# Patient Record
Sex: FEMALE | Race: BLACK OR AFRICAN AMERICAN | NOT HISPANIC OR LATINO
[De-identification: names, ages, dates, MRNs, and addresses within clinical notes are randomized per-mention and may not be internally consistent; named-entity substitution may affect disease eponyms.]

---

## 2021-02-03 PROBLEM — Z00.00 ENCOUNTER FOR PREVENTIVE HEALTH EXAMINATION: Status: ACTIVE | Noted: 2021-02-03

## 2021-02-04 ENCOUNTER — APPOINTMENT (OUTPATIENT)
Dept: NEUROSURGERY | Facility: CLINIC | Age: 31
End: 2021-02-04

## 2021-03-11 ENCOUNTER — APPOINTMENT (OUTPATIENT)
Dept: NEUROSURGERY | Facility: CLINIC | Age: 31
End: 2021-03-11
Payer: MEDICAID

## 2021-03-11 VITALS
HEART RATE: 111 BPM | SYSTOLIC BLOOD PRESSURE: 127 MMHG | BODY MASS INDEX: 28 KG/M2 | DIASTOLIC BLOOD PRESSURE: 88 MMHG | TEMPERATURE: 97.9 F | WEIGHT: 158 LBS | HEIGHT: 63 IN | OXYGEN SATURATION: 100 %

## 2021-03-11 DIAGNOSIS — Z78.9 OTHER SPECIFIED HEALTH STATUS: ICD-10-CM

## 2021-03-11 PROCEDURE — 99204 OFFICE O/P NEW MOD 45 MIN: CPT

## 2021-03-11 PROCEDURE — 99072 ADDL SUPL MATRL&STAF TM PHE: CPT

## 2021-03-11 RX ORDER — CETIRIZINE HYDROCHLORIDE 10 MG/1
CAPSULE, LIQUID FILLED ORAL
Refills: 0 | Status: ACTIVE | COMMUNITY

## 2021-03-11 RX ORDER — FLUTICASONE PROPIONATE 50 MCG
SPRAY, SUSPENSION NASAL
Refills: 0 | Status: ACTIVE | COMMUNITY

## 2021-03-11 NOTE — PHYSICAL EXAM
[General Appearance - Alert] : alert [General Appearance - In No Acute Distress] : in no acute distress [Oriented To Time, Place, And Person] : oriented to person, place, and time [Person] : oriented to person [Place] : oriented to place [Time] : oriented to time [Sclera] : the sclera and conjunctiva were normal [Outer Ear] : the ears and nose were normal in appearance [] : no respiratory distress [Abnormal Walk] : normal gait [Skin Color & Pigmentation] : normal skin color and pigmentation

## 2021-03-22 NOTE — REVIEW OF SYSTEMS
[As Noted in HPI] : as noted in HPI [Negative] : Heme/Lymph [de-identified] : Headaches [FreeTextEntry4] : Left Intermittent Pulsatile Tinnitus

## 2021-03-22 NOTE — ASSESSMENT
[FreeTextEntry1] : Impression:\par Recent diagnosis of papilledema\par Presumptive IIH\par Needs further work-up with MRV/ LP/ Venogram\par \par Plan:\par Fluoroscopy guided lumbar puncture\par - Opening pressure measurement\par - CSF analysis\par Cell Count\par Glucose\par Protein\par Cytology\par Gram Stain\par Oligoclonal Bands\par Culture and Sensitivity\par \par Catheter Cerebral Venogram to assess for venous stenosis\par \par

## 2021-03-23 ENCOUNTER — NON-APPOINTMENT (OUTPATIENT)
Age: 31
End: 2021-03-23

## 2021-04-27 ENCOUNTER — TRANSCRIPTION ENCOUNTER (OUTPATIENT)
Age: 31
End: 2021-04-27

## 2021-04-30 ENCOUNTER — RESULT REVIEW (OUTPATIENT)
Age: 31
End: 2021-04-30

## 2021-04-30 ENCOUNTER — OUTPATIENT (OUTPATIENT)
Dept: OUTPATIENT SERVICES | Facility: HOSPITAL | Age: 31
LOS: 1 days | End: 2021-04-30
Payer: MEDICAID

## 2021-04-30 ENCOUNTER — APPOINTMENT (OUTPATIENT)
Dept: NEUROSURGERY | Facility: CLINIC | Age: 31
End: 2021-04-30
Payer: MEDICAID

## 2021-04-30 VITALS
SYSTOLIC BLOOD PRESSURE: 113 MMHG | WEIGHT: 154.98 LBS | OXYGEN SATURATION: 100 % | HEART RATE: 101 BPM | TEMPERATURE: 99 F | DIASTOLIC BLOOD PRESSURE: 70 MMHG | HEIGHT: 63 IN | RESPIRATION RATE: 16 BRPM

## 2021-04-30 VITALS
RESPIRATION RATE: 14 BRPM | OXYGEN SATURATION: 100 % | HEART RATE: 99 BPM | SYSTOLIC BLOOD PRESSURE: 112 MMHG | DIASTOLIC BLOOD PRESSURE: 79 MMHG | TEMPERATURE: 98 F

## 2021-04-30 DIAGNOSIS — I87.1 COMPRESSION OF VEIN: ICD-10-CM

## 2021-04-30 DIAGNOSIS — G93.2 BENIGN INTRACRANIAL HYPERTENSION: ICD-10-CM

## 2021-04-30 LAB
APPEARANCE CSF: CLEAR — SIGNIFICANT CHANGE UP
APTT BLD: 35.9 SEC — HIGH (ref 27.5–35.5)
BLD GP AB SCN SERPL QL: NEGATIVE — SIGNIFICANT CHANGE UP
COLOR CSF: SIGNIFICANT CHANGE UP
GLUCOSE CSF-MCNC: 55 MG/DL — SIGNIFICANT CHANGE UP (ref 40–70)
GRAM STN FLD: SIGNIFICANT CHANGE UP
INR BLD: 1.05 RATIO — SIGNIFICANT CHANGE UP (ref 0.88–1.16)
LYMPHOCYTES # CSF: 89 % — HIGH (ref 40–80)
MONOS+MACROS NFR CSF: 11 % — LOW (ref 15–45)
NEUTROPHILS # CSF: 0 % — SIGNIFICANT CHANGE UP (ref 0–6)
NRBC NFR CSF: 1 /UL — SIGNIFICANT CHANGE UP (ref 0–5)
PROT CSF-MCNC: 20 MG/DL — SIGNIFICANT CHANGE UP (ref 15–45)
PROTHROM AB SERPL-ACNC: 12.6 SEC — SIGNIFICANT CHANGE UP (ref 10.6–13.6)
RBC # CSF: 0 /UL — SIGNIFICANT CHANGE UP (ref 0–0)
RH IG SCN BLD-IMP: POSITIVE — SIGNIFICANT CHANGE UP
RH IG SCN BLD-IMP: POSITIVE — SIGNIFICANT CHANGE UP
SPECIMEN SOURCE: SIGNIFICANT CHANGE UP
TUBE TYPE: SIGNIFICANT CHANGE UP

## 2021-04-30 PROCEDURE — 36012 PLACE CATHETER IN VEIN: CPT | Mod: LT

## 2021-04-30 PROCEDURE — C1887: CPT

## 2021-04-30 PROCEDURE — 87205 SMEAR GRAM STAIN: CPT

## 2021-04-30 PROCEDURE — 36012 PLACE CATHETER IN VEIN: CPT

## 2021-04-30 PROCEDURE — 87070 CULTURE OTHR SPECIMN AEROBIC: CPT

## 2021-04-30 PROCEDURE — C1760: CPT

## 2021-04-30 PROCEDURE — 86850 RBC ANTIBODY SCREEN: CPT

## 2021-04-30 PROCEDURE — 75870 VEIN X-RAY SKULL: CPT

## 2021-04-30 PROCEDURE — 86900 BLOOD TYPING SEROLOGIC ABO: CPT

## 2021-04-30 PROCEDURE — 62328 DX LMBR SPI PNXR W/FLUOR/CT: CPT

## 2021-04-30 PROCEDURE — 88108 CYTOPATH CONCENTRATE TECH: CPT | Mod: 26

## 2021-04-30 PROCEDURE — 82945 GLUCOSE OTHER FLUID: CPT

## 2021-04-30 PROCEDURE — 86901 BLOOD TYPING SEROLOGIC RH(D): CPT

## 2021-04-30 PROCEDURE — 85730 THROMBOPLASTIN TIME PARTIAL: CPT

## 2021-04-30 PROCEDURE — C1769: CPT

## 2021-04-30 PROCEDURE — 88108 CYTOPATH CONCENTRATE TECH: CPT

## 2021-04-30 PROCEDURE — 75870 VEIN X-RAY SKULL: CPT | Mod: 26

## 2021-04-30 PROCEDURE — C1894: CPT

## 2021-04-30 PROCEDURE — 89051 BODY FLUID CELL COUNT: CPT

## 2021-04-30 PROCEDURE — 84157 ASSAY OF PROTEIN OTHER: CPT

## 2021-04-30 PROCEDURE — 83916 OLIGOCLONAL BANDS: CPT

## 2021-04-30 PROCEDURE — 85610 PROTHROMBIN TIME: CPT

## 2021-04-30 RX ORDER — SODIUM CHLORIDE 9 MG/ML
1000 INJECTION INTRAMUSCULAR; INTRAVENOUS; SUBCUTANEOUS
Refills: 0 | Status: DISCONTINUED | OUTPATIENT
Start: 2021-04-30 | End: 2021-05-15

## 2021-04-30 RX ORDER — SODIUM CHLORIDE 9 MG/ML
1000 INJECTION, SOLUTION INTRAVENOUS
Refills: 0 | Status: DISCONTINUED | OUTPATIENT
Start: 2021-04-30 | End: 2021-05-15

## 2021-04-30 NOTE — ASU DISCHARGE PLAN (ADULT/PEDIATRIC) - CARE PROVIDER_API CALL
Bonnie Schulz; MPH)  Radiology  [address] [place] [address]  Phone: [phone]  Fax: [phone]  Follow Up Time:

## 2021-04-30 NOTE — CHART NOTE - NSCHARTNOTEFT_GEN_A_CORE
Interventional Neuro- Radiology   Procedure Note PA-CRISTINE    Procedure: Lumbar puncture   Pre- Procedure Diagnosis:  Post- Procedure Diagnosis:    : Dr Bonnie Schulz   Physician Assistant: Carola Taveras PA-C    Nurse:  Radiologic Tech:  Anesthesiologist:  Sheath:      I/Os: EBL less than 10cc  IV fluids: Urine output     cc    Contrast Omnipaque 240      cc             Vitals: BP         HR      Spo2     %          Preliminary Report: Interventional Neuro- Radiology   Procedure Note PA-C    Procedure: Lumbar puncture   Pre- Procedure Diagnosis: idiopathic intracranial hypertension   Post- Procedure Diagnosis:    : Dr Bonnie Schulz   Physician Assistant: Carola Taveras PA-C    Nurse:                    Anastasia Lopez RN  Radiologic Tech:    Randy Redd LRT, Shadi Powell LRT, Axel Peterson LRT  Anesthesiologist:   Dr Vasquez Dwyer   Spinal needle:        5 inch 22 gauge   Opening pressure   46       I/Os: EBL less than 2cc       Vitals: /72        HR 78     Spo2 100%          Preliminary Report: Patient was placed in the left lateral decubitus position, the area l3-l4 are was marked with fluoroscopy. The are was cleaned and draped in the usual sterile fashion. Lidocaine was utilized to anesthetize the area. Under fluoroscopy the spinal needle was placed. The opening pressure was obtained and CSF was collected. NO complications. patient tolerated procedure well. Vital signs remained stable. Disposition recovery room first floor, supine and HOB flat.

## 2021-04-30 NOTE — CHART NOTE - NSCHARTNOTEFT_GEN_A_CORE
Interventional Neuro Radiology  Pre-Procedure Note PA-C    This is a 31y ____ hand dominant Female      HPI:      Allergies: No Known Allergies      PAST MEDICAL & SURGICAL HISTORY:      Social History:     FAMILY HISTORY:      Current Medications:     Labs:                   Blood Bank: 04-30-21  O  --  Positive        Assessment/Plan:     This is a 31y  year old right  hand dominant Female  presents with   Patient presents to neuro-IR for selective cerebral angiography.   Procedure, goals, risks, benefits and alternatives  were discussed with patient and patient's family.  All questions were answered.  Risks include but are not limited to stroke, vessel injury, hemorrhage, and or right  groin hematoma.  Patient demonstrates understanding  of all risks involved with this procedure and wishes to continue.   Appropriate  content was obtained from patient and consent is in the patient's chart. Interventional Neuro Radiology  Pre-Procedure Note PA-C    This is a 31 year old right hand dominant previously healthy female with complaints of left ear tinnitus. Patient presents to Neuro IR for a catheter cerebral venogram and lumbar puncture.     Upon exam patient is A + O x 3, speech is articulate, thought process is coherent, follows commands, PERRL, EOMI, tongue is midline, +facial symmetry, motor 5/5 and ambulates without assist.     Allergies: No Known Allergies  PMHX:   PSHX:  Social History: non-smoker, no ETOH and no illicit substances   FAMILY HISTORY: non-contributory   Current Medications:   Covid: non-reactive                       Blood Bank: O positive available     Assessment/Plan:   This is a 31 year old right hand dominant female with idiopathic intracranial hypertension, who presents to Neuro IR for a selective cerebral venogram and lumbar puncture. Procedure, goals, risks, benefits and alternatives were discussed with patient. All questions were answered. Risks include but are not limited to stroke, vessel injury, hemorrhage, pain at incision site and or right groin hematoma. Patient demonstrates understanding of all risks involved with this procedure and wishes to continue. Appropriate content was obtained from patient and consent is in the patient's chart. Interventional Neuro Radiology  Pre-Procedure Note PA-C    This is a 31 year old right hand dominant previously healthy female with complaints of left ear tinnitus. Patient presents to Neuro IR for a catheter cerebral venogram and lumbar puncture.     Upon exam patient is A + O x 3, speech is articulate, thought process is coherent, follows commands, PERRL, EOMI, tongue is midline, +facial symmetry, motor 5/5 and ambulates without assist.     Allergies: No Known Allergies  PMHX: seborrheic hidradenitis   PSHX:  Social History: non-smoker, no ETOH and no illicit substances   FAMILY HISTORY: non-contributory   Current Medications:   Covid: non-reactive     CBC:        11.4  5.5  36.4   318    BMP:  138  102  10   4.2    28  0.83  91      PTT 35.9  PT   12.6  INR  1.05    Blood Bank: O positive available     Assessment/Plan:   This is a 31 year old right hand dominant female with idiopathic intracranial hypertension, who presents to Neuro IR for a selective cerebral venogram and lumbar puncture. Procedure, goals, risks, benefits and alternatives were discussed with patient. All questions were answered. Risks include but are not limited to stroke, vessel injury, hemorrhage, pain at incision site and or right groin hematoma. Patient demonstrates understanding of all risks involved with this procedure and wishes to continue. Appropriate content was obtained from patient and consent is in the patient's chart.

## 2021-04-30 NOTE — CHART NOTE - NSCHARTNOTEFT_GEN_A_CORE
Interventional Neuro- Radiology   Procedure Note PA-CRISTINE    Procedure: Selective Cerebral Venogram   Pre- Procedure Diagnosis:  Post- Procedure Diagnosis:    : Dr Bonnie Schulz   Physician Assistant: Carola Taveras PA-C    Nurse:  Radiologic Tech:  Anesthesiologist:  Sheath:      I/Os: EBL less than 10cc  IV fluids:     cc     Urine output     cc    Contrast Omnipaque 240      cc             Vitals: BP         HR      Spo2     %          Preliminary Report:  Using a 5 Romansh long sheath to the right groin under MAC sedation via left vertebral artery,  left internal carotid artery, left external carotid artery, right vertebral artery, right internal carotid artery, right external carotid artery a selective cerebral angiography was performed and demonstrated                       Official note to follow.  Patient tolerated procedure well, hemodynamically stable, no change in neurological status compared to baseline.  Results discussed with neuro ICU team, patient and patient's family. Right groin sheath was removed, manual compression held to hemostasis for 20 minutes, no active bleeding, no hematoma, quick clot and safeguard balloon dressing applied at Interventional Neuro- Radiology   Procedure Note PA-C    Procedure: Selective Cerebral Venogram   Pre- Procedure Diagnosis: idiopathic intracranial hypertension   Post- Procedure Diagnosis: same     : Dr Bonnie Schulz   Physician Assistant: Carola Taveras PA-C    Nurse:                   Anastasia Lopez RN  Radiologic Tech:   Axel Peterson LRT, Shadi Powell LRT, Randy kee LRT  Anesthesiologist:  Dr Vasquez Dwyer   Sheath:                 5 Sami short sheath       I/Os: EBL less than 10cc  IV fluids: 600 Urine due to void  Contrast Omnipaque 240              Vitals: /70       HR 70     Spo2 100%          Preliminary Report:  Using a 5 Sami short sheath to the right groin under MAC sedation a venogram was performed and demonstrated venous sinus stenosis. Official note to follow.  Patient tolerated procedure well, hemodynamically stable, no change in neurological status compared to baseline. Results discussed with patient and patient's family. Right groin sheath was removed, manual compression held to hemostasis for 20 minutes, no active bleeding, no hematoma, quick clot and safeguard balloon dressing applied at 1605.

## 2021-05-03 LAB
CULTURE RESULTS: NO GROWTH — SIGNIFICANT CHANGE UP
NON-GYNECOLOGICAL CYTOLOGY STUDY: SIGNIFICANT CHANGE UP
SPECIMEN SOURCE: SIGNIFICANT CHANGE UP

## 2021-05-06 LAB — OLIGOCLONAL BANDS CSF ELPH-IMP: SIGNIFICANT CHANGE UP

## 2021-06-22 ENCOUNTER — NON-APPOINTMENT (OUTPATIENT)
Age: 31
End: 2021-06-22

## 2021-07-20 ENCOUNTER — OUTPATIENT (OUTPATIENT)
Dept: OUTPATIENT SERVICES | Facility: HOSPITAL | Age: 31
LOS: 1 days | End: 2021-07-20
Payer: MEDICAID

## 2021-07-20 VITALS
WEIGHT: 143.08 LBS | TEMPERATURE: 98 F | SYSTOLIC BLOOD PRESSURE: 103 MMHG | RESPIRATION RATE: 18 BRPM | HEART RATE: 102 BPM | HEIGHT: 62 IN | OXYGEN SATURATION: 99 % | DIASTOLIC BLOOD PRESSURE: 74 MMHG

## 2021-07-20 DIAGNOSIS — Z11.52 ENCOUNTER FOR SCREENING FOR COVID-19: ICD-10-CM

## 2021-07-20 DIAGNOSIS — Z98.890 OTHER SPECIFIED POSTPROCEDURAL STATES: Chronic | ICD-10-CM

## 2021-07-20 DIAGNOSIS — G93.2 BENIGN INTRACRANIAL HYPERTENSION: ICD-10-CM

## 2021-07-20 DIAGNOSIS — Z01.818 ENCOUNTER FOR OTHER PREPROCEDURAL EXAMINATION: ICD-10-CM

## 2021-07-20 LAB
ANION GAP SERPL CALC-SCNC: 11 MMOL/L — SIGNIFICANT CHANGE UP (ref 5–17)
BLD GP AB SCN SERPL QL: NEGATIVE — SIGNIFICANT CHANGE UP
BUN SERPL-MCNC: 7 MG/DL — SIGNIFICANT CHANGE UP (ref 7–23)
CALCIUM SERPL-MCNC: 10.1 MG/DL — SIGNIFICANT CHANGE UP (ref 8.4–10.5)
CHLORIDE SERPL-SCNC: 103 MMOL/L — SIGNIFICANT CHANGE UP (ref 96–108)
CO2 SERPL-SCNC: 24 MMOL/L — SIGNIFICANT CHANGE UP (ref 22–31)
CREAT SERPL-MCNC: 0.85 MG/DL — SIGNIFICANT CHANGE UP (ref 0.5–1.3)
GLUCOSE SERPL-MCNC: 95 MG/DL — SIGNIFICANT CHANGE UP (ref 70–99)
HCT VFR BLD CALC: 38.3 % — SIGNIFICANT CHANGE UP (ref 34.5–45)
HGB BLD-MCNC: 11.7 G/DL — SIGNIFICANT CHANGE UP (ref 11.5–15.5)
INR BLD: 1.06 RATIO — SIGNIFICANT CHANGE UP (ref 0.88–1.16)
MCHC RBC-ENTMCNC: 27 PG — SIGNIFICANT CHANGE UP (ref 27–34)
MCHC RBC-ENTMCNC: 30.5 GM/DL — LOW (ref 32–36)
MCV RBC AUTO: 88.5 FL — SIGNIFICANT CHANGE UP (ref 80–100)
NRBC # BLD: 0 /100 WBCS — SIGNIFICANT CHANGE UP (ref 0–0)
PLATELET # BLD AUTO: 314 K/UL — SIGNIFICANT CHANGE UP (ref 150–400)
POTASSIUM SERPL-MCNC: 4.3 MMOL/L — SIGNIFICANT CHANGE UP (ref 3.5–5.3)
POTASSIUM SERPL-SCNC: 4.3 MMOL/L — SIGNIFICANT CHANGE UP (ref 3.5–5.3)
PROTHROM AB SERPL-ACNC: 12.7 SEC — SIGNIFICANT CHANGE UP (ref 10.6–13.6)
RBC # BLD: 4.33 M/UL — SIGNIFICANT CHANGE UP (ref 3.8–5.2)
RBC # FLD: 13.8 % — SIGNIFICANT CHANGE UP (ref 10.3–14.5)
RH IG SCN BLD-IMP: POSITIVE — SIGNIFICANT CHANGE UP
SARS-COV-2 RNA SPEC QL NAA+PROBE: SIGNIFICANT CHANGE UP
SODIUM SERPL-SCNC: 138 MMOL/L — SIGNIFICANT CHANGE UP (ref 135–145)
WBC # BLD: 3.91 K/UL — SIGNIFICANT CHANGE UP (ref 3.8–10.5)
WBC # FLD AUTO: 3.91 K/UL — SIGNIFICANT CHANGE UP (ref 3.8–10.5)

## 2021-07-20 PROCEDURE — 86850 RBC ANTIBODY SCREEN: CPT

## 2021-07-20 PROCEDURE — U0005: CPT

## 2021-07-20 PROCEDURE — 85610 PROTHROMBIN TIME: CPT

## 2021-07-20 PROCEDURE — 86901 BLOOD TYPING SEROLOGIC RH(D): CPT

## 2021-07-20 PROCEDURE — G0463: CPT

## 2021-07-20 PROCEDURE — C9803: CPT

## 2021-07-20 PROCEDURE — 86900 BLOOD TYPING SEROLOGIC ABO: CPT

## 2021-07-20 PROCEDURE — U0003: CPT

## 2021-07-20 PROCEDURE — 85027 COMPLETE CBC AUTOMATED: CPT

## 2021-07-20 PROCEDURE — 80048 BASIC METABOLIC PNL TOTAL CA: CPT

## 2021-07-20 NOTE — H&P PST ADULT - HISTORY OF PRESENT ILLNESS
This is a 31 year old female with past medical hsitory of idiopathic intracranial hypertenstion      S/P     **Covid vaccination  Part 1 (Pzifer) on 6/28. Schelduled for Covid swab today at [facility]  ** Denies any history of Covid infection. Denies any recent illness or exposure    This is a 31 year old female with past medical history of idiopathic intracranial hypertension has complaints of left ear tinnitus and occasional blurred vision. S/P IR procedure cerebral venogram and lumbar puncture done on 5/4/21. Pt is today presenting to [facility] for Venous Sinus stent on 7/23/21 with Dr. Schulz       **Covid vaccination  Part 1 (Pzifer) on 6/28.  2nd vaccine to be re- scheduled.  [facility]  ** Denies any history of Covid infection. Denies any recent illness or exposure

## 2021-07-20 NOTE — H&P PST ADULT - HEALTH CARE MAINTENANCE
Follows up PCP annually and PRN  1st Covid vaccination and 2nd to be re-scheduled   Uptodate with women health

## 2021-07-20 NOTE — H&P PST ADULT - NSICDXPROBLEM_GEN_ALL_CORE_FT
PROBLEM DIAGNOSES  Problem: IIH (idiopathic intracranial hypertension)  Assessment and Plan: Scheduled for Venous sinus stent on 7/23/21  Preop instructons and chlorhexidine soap given  Labs CBC BMP PT/INR and T&S performed in PST  Covid swab to be performed today

## 2021-07-20 NOTE — H&P PST ADULT - LAST ECHOCARDIOGRAM
April 2021- Performed prior to IR procedure April 2021- Performed prior to IR procedure c/o palpations, normal LV systolic function, Normal RV systolic fxn

## 2021-07-20 NOTE — H&P PST ADULT - NSICDXPASTMEDICALHX_GEN_ALL_CORE_FT
PAST MEDICAL HISTORY:  Diet-controlled hyperlipidemia     IIH (idiopathic intracranial hypertension)

## 2021-07-20 NOTE — H&P PST ADULT - NSICDXFAMILYHX_GEN_ALL_CORE_FT
FAMILY HISTORY:  Grandparent  Still living? Unknown  Family history of heart attack, Age at diagnosis: Age Unknown

## 2021-07-23 ENCOUNTER — OUTPATIENT (OUTPATIENT)
Dept: OUTPATIENT SERVICES | Facility: HOSPITAL | Age: 31
LOS: 1 days | End: 2021-07-23
Payer: MEDICAID

## 2021-07-23 ENCOUNTER — APPOINTMENT (OUTPATIENT)
Dept: NEUROSURGERY | Facility: HOSPITAL | Age: 31
End: 2021-07-23

## 2021-07-23 ENCOUNTER — RESULT REVIEW (OUTPATIENT)
Age: 31
End: 2021-07-23

## 2021-07-23 VITALS
SYSTOLIC BLOOD PRESSURE: 123 MMHG | HEART RATE: 102 BPM | HEIGHT: 62.5 IN | WEIGHT: 143.08 LBS | OXYGEN SATURATION: 99 % | TEMPERATURE: 98 F | RESPIRATION RATE: 16 BRPM | DIASTOLIC BLOOD PRESSURE: 89 MMHG

## 2021-07-23 VITALS
OXYGEN SATURATION: 98 % | HEART RATE: 90 BPM | DIASTOLIC BLOOD PRESSURE: 80 MMHG | SYSTOLIC BLOOD PRESSURE: 120 MMHG | RESPIRATION RATE: 18 BRPM

## 2021-07-23 DIAGNOSIS — Z98.890 OTHER SPECIFIED POSTPROCEDURAL STATES: Chronic | ICD-10-CM

## 2021-07-23 DIAGNOSIS — G93.2 BENIGN INTRACRANIAL HYPERTENSION: ICD-10-CM

## 2021-07-23 PROCEDURE — 75860 VEIN X-RAY NECK: CPT | Mod: 26,59

## 2021-07-23 PROCEDURE — C1760: CPT

## 2021-07-23 PROCEDURE — 36226 PLACE CATH VERTEBRAL ART: CPT | Mod: LT

## 2021-07-23 PROCEDURE — C1876: CPT

## 2021-07-23 PROCEDURE — 37239 OPEN/PERQ PLACE STENT EA ADD: CPT | Mod: RT

## 2021-07-23 PROCEDURE — 75870 VEIN X-RAY SKULL: CPT | Mod: 26,59

## 2021-07-23 PROCEDURE — C1769: CPT

## 2021-07-23 PROCEDURE — 36012 PLACE CATHETER IN VEIN: CPT | Mod: RT

## 2021-07-23 PROCEDURE — 37238 OPEN/PERQ PLACE STENT SAME: CPT | Mod: RT

## 2021-07-23 PROCEDURE — 36223 PLACE CATH CAROTID/INOM ART: CPT | Mod: 50

## 2021-07-23 PROCEDURE — 36012 PLACE CATHETER IN VEIN: CPT

## 2021-07-23 PROCEDURE — 36223 PLACE CATH CAROTID/INOM ART: CPT

## 2021-07-23 PROCEDURE — C9399: CPT

## 2021-07-23 PROCEDURE — 37238 OPEN/PERQ PLACE STENT SAME: CPT

## 2021-07-23 PROCEDURE — 36226 PLACE CATH VERTEBRAL ART: CPT

## 2021-07-23 PROCEDURE — C1887: CPT

## 2021-07-23 PROCEDURE — C1894: CPT

## 2021-07-23 RX ORDER — ACETAMINOPHEN 500 MG
1000 TABLET ORAL ONCE
Refills: 0 | Status: DISCONTINUED | OUTPATIENT
Start: 2021-07-23 | End: 2021-08-06

## 2021-07-23 RX ORDER — OXYCODONE AND ACETAMINOPHEN 5; 325 MG/1; MG/1
1 TABLET ORAL
Qty: 16 | Refills: 0
Start: 2021-07-23 | End: 2021-07-26

## 2021-07-23 RX ORDER — CLOPIDOGREL BISULFATE 75 MG/1
75 TABLET, FILM COATED ORAL ONCE
Refills: 0 | Status: COMPLETED | OUTPATIENT
Start: 2021-07-23 | End: 2021-07-23

## 2021-07-23 RX ORDER — CETIRIZINE HYDROCHLORIDE 10 MG/1
1 TABLET ORAL
Qty: 0 | Refills: 0 | DISCHARGE

## 2021-07-23 RX ORDER — ASPIRIN/CALCIUM CARB/MAGNESIUM 324 MG
1 TABLET ORAL
Qty: 0 | Refills: 0 | DISCHARGE

## 2021-07-23 RX ORDER — CLOPIDOGREL BISULFATE 75 MG/1
1 TABLET, FILM COATED ORAL
Qty: 0 | Refills: 0 | DISCHARGE

## 2021-07-23 RX ORDER — ASPIRIN/CALCIUM CARB/MAGNESIUM 324 MG
325 TABLET ORAL ONCE
Refills: 0 | Status: COMPLETED | OUTPATIENT
Start: 2021-07-23 | End: 2021-07-23

## 2021-07-23 RX ORDER — KETOROLAC TROMETHAMINE 30 MG/ML
1 SYRINGE (ML) INJECTION
Qty: 12 | Refills: 0
Start: 2021-07-23 | End: 2021-07-26

## 2021-07-23 RX ORDER — SODIUM CHLORIDE 9 MG/ML
1000 INJECTION INTRAMUSCULAR; INTRAVENOUS; SUBCUTANEOUS
Refills: 0 | Status: DISCONTINUED | OUTPATIENT
Start: 2021-07-23 | End: 2021-08-06

## 2021-07-23 RX ORDER — FLUTICASONE PROPIONATE 50 MCG
1 SPRAY, SUSPENSION NASAL
Qty: 0 | Refills: 0 | DISCHARGE

## 2021-07-23 RX ADMIN — CLOPIDOGREL BISULFATE 75 MILLIGRAM(S): 75 TABLET, FILM COATED ORAL at 10:45

## 2021-07-23 RX ADMIN — Medication 325 MILLIGRAM(S): at 10:45

## 2021-07-23 NOTE — CHART NOTE - NSCHARTNOTEFT_GEN_A_CORE
Interventional Neuro Radiology  Pre-Procedure Note    This is a 31y ____ hand dominant Female      HPI:      Neuro Exam: Awake and alert, oriented x3, fluent, normal naming and repetition, follows 3 step commands. Extraocular movements intact, no nystagmus, visual fields full, face symmetric, tongue midline. No drift, 5/5 power x 4 extremities. Normal sensation to LT. Normal finger-to-nose and rapid alternating movements.    PAST MEDICAL & SURGICAL HISTORY:  IIH (idiopathic intracranial hypertension)    Diet-controlled hyperlipidemia    History of cervical cerclage  2010        Social History:   Denies tobacco use    FAMILY HISTORY:  Family history of heart attack (Grandparent)      No pertinent family history    Allergies: No Known Allergies      Current Medications:     Labs:                         11.7   3.91  )-----------( 314      ( 20 Jul 2021 12:09 )             38.3       07-20    138  |  103  |  7   ----------------------------<  95  4.3   |  24  |  0.85            HCG:     Blood Bank: 07-20-21  O  --  Positive      Assessment/Plan:   This is a 31y ____ hand dominant Female  presents with ______. Patient presents to neuro-IR for selective cerebral angiography. Procedure/ risks/ benefits/ goals/ alternatives were explained. Risks include but are not limited to stroke/ vessel injury/ hemorrhage/ groin hematoma. All questions answered. Informed content obtained from patient____. Consent placed in chart.

## 2021-07-23 NOTE — PRE-ANESTHESIA EVALUATION ADULT - LAST ECHOCARDIOGRAM
April 2021- Performed prior to IR procedure c/o palpations, normal LV systolic function, Normal RV systolic fxn

## 2021-07-23 NOTE — CHART NOTE - NSCHARTNOTEFT_GEN_A_CORE
Interventional Neuro- Radiology   Procedure Note      Procedure: Selective Cerebral Angiography/ Venography/ Manometry/ Venous Sinus Stenting  Pre- Procedure Diagnosis: idiopathic intracranial hypertension  Post- Procedure Diagnosis:    : Dr. Zeus MD  Fellow: Dr. Ramirez, Dr. Nan MD  Physician Assistant: Leah Guillen PA-C  Resident: Dr. Sanjana MD    RN: Rosey/ Heydi  Tech: Ellis/ Robby/ Axel    Anesthesia: Dr. Sandra MD (general anesthesia)    I/Os:  Fluids:  Contrast:  Estimated Blood Loss: <10cc    Preliminary Report:  Under general anesthesia, using a 8Fr short sheath to the right groin and using a 5/4 radial sheath to the right wrist examination of superior sagittal sinus, right transverse sinus, right sigmoid sinus via selective cerebral venography were performed and demonstrated venous sinus stenosis; stent placement in left sigmoid and left transverse sinus performed. (Official note to follow).    Patient tolerated procedure well, vital signs stable, hemodynamically stable, no change in neurological status compared to baseline. Results discussed with neurosurgery/ patient and their family. Groin sheath d/c'ed, manual compression held to hemostasis, no active bleeding, no hematoma, vascade closure device applied, quick clot and safeguard balloon dressing applied at 1315h. Radial sheath d/c'd, TR band applied at 1300h with 12cc of air; no bleeding, no hematoma. Patient transferred to IR recovery for further care/ monitoring. Interventional Neuro- Radiology   Procedure Note      Procedure: Selective Cerebral Angiography/ Venography/ Manometry/ Venous Sinus Stenting  Pre- Procedure Diagnosis: idiopathic intracranial hypertension; venous sinus stenosis  Post- Procedure Diagnosis: stent placement in left sigmoid and left transverse sinus    : Dr. Zeus MD  Fellow: Dr. Ramirez, Dr. Nan MD  Physician Assistant: Leah Guillen PA-C  Resident: Dr. Sanjana MD    RN: Rosey/ Heydi  Tech: Ellis/ Robby/ Axel    Anesthesia: Dr. Sandra MD (general anesthesia)    I/Os:  Fluids: 500 cc DTV  Contrast: 91 cc  Estimated Blood Loss: <10cc    Preliminary Report:  Under general anesthesia, using a 8Fr short sheath to the right groin and using a 5/4 radial sheath to the right wrist examination of superior sagittal sinus, right transverse sinus, right sigmoid sinus via selective cerebral venography were performed and demonstrated venous sinus stenosis; stent placement in left sigmoid and left transverse sinus performed; post stent manometry performed. (Official note to follow).    Patient tolerated procedure well, vital signs stable, hemodynamically stable, no change in neurological status compared to baseline. Results discussed with neurosurgery/ patient and their family. Groin sheath d/c'ed, manual compression held to hemostasis, no active bleeding, no hematoma, vascade closure device applied, quick clot and safeguard balloon dressing applied at 1315h. Radial sheath d/c'd, TR band applied at 1300h with 12cc of air; no bleeding, no hematoma. Patient transferred to IR recovery for further care/ monitoring.    Leah Guillen PA-C Interventional Neuro- Radiology   Procedure Note      Procedure: Selective Cerebral Angiography/ Venography/ Manometry/ Venous Sinus Stenting  Pre- Procedure Diagnosis: idiopathic intracranial hypertension; venous sinus stenosis  Post- Procedure Diagnosis: stent placement in left sigmoid and left transverse sinus    : Dr. Zeus MD  Fellow: Dr. Ramirez, Dr. Nan MD  Physician Assistant: Leah Guillen PA-C  Resident: Dr. Sanjana MD    RN: Rosey/ Heydi  Tech: Ellis/ Robby/ Axel    Anesthesia: Dr. Sandra MD (general anesthesia)    I/Os:  Fluids: 500 cc DTV  Contrast: 91 cc  Estimated Blood Loss: <10cc    Chirag VINCENT    Preliminary Report:  Under general anesthesia, using a 8Fr short sheath to the right groin and using a 5/4 radial sheath to the right wrist examination of superior sagittal sinus, right transverse sinus, right sigmoid sinus via selective cerebral venography were performed and demonstrated venous sinus stenosis; stent placement in left sigmoid and left transverse sinus performed; post stent manometry performed. (Official note to follow).    Patient tolerated procedure well, vital signs stable, hemodynamically stable, no change in neurological status compared to baseline. Results discussed with neurosurgery/ patient and their family. Groin sheath d/c'ed, manual compression held to hemostasis, no active bleeding, no hematoma, vascade closure device applied, quick clot and safeguard balloon dressing applied at 1315h. Radial sheath d/c'd, TR band applied at 1300h with 12cc of air; no bleeding, no hematoma. Patient transferred to IR recovery for further care/ monitoring.    Leah Guillen PA-C

## 2021-07-23 NOTE — ASU DISCHARGE PLAN (ADULT/PEDIATRIC) - CARE PROVIDER_API CALL
Bonnie Schulz; MPH)  Radiology  [address] [facility], [address]  [place] [address]  Phone: [phone]  Fax: [phone]  Follow Up Time:

## 2021-07-27 ENCOUNTER — NON-APPOINTMENT (OUTPATIENT)
Age: 31
End: 2021-07-27

## 2021-07-27 PROBLEM — E78.5 HYPERLIPIDEMIA, UNSPECIFIED: Chronic | Status: ACTIVE | Noted: 2021-07-20

## 2021-07-27 PROBLEM — G93.2 BENIGN INTRACRANIAL HYPERTENSION: Chronic | Status: ACTIVE | Noted: 2021-07-20

## 2021-07-28 DIAGNOSIS — I87.1 COMPRESSION OF VEIN: ICD-10-CM

## 2021-07-28 DIAGNOSIS — G93.2 BENIGN INTRACRANIAL HYPERTENSION: ICD-10-CM

## 2021-08-20 ENCOUNTER — APPOINTMENT (OUTPATIENT)
Dept: NEUROSURGERY | Facility: CLINIC | Age: 31
End: 2021-08-20
Payer: MEDICAID

## 2021-08-20 VITALS
OXYGEN SATURATION: 98 % | DIASTOLIC BLOOD PRESSURE: 78 MMHG | WEIGHT: 141 LBS | HEART RATE: 103 BPM | BODY MASS INDEX: 24.98 KG/M2 | SYSTOLIC BLOOD PRESSURE: 111 MMHG

## 2021-08-20 PROCEDURE — 99212 OFFICE O/P EST SF 10 MIN: CPT

## 2021-08-20 NOTE — HISTORY OF PRESENT ILLNESS
[FreeTextEntry1] : Ms. BENSON is a pleasant 32yo female who presents today after successful venous sinus stenting on 7/23/21 by Dr. Schulz.  The patient went home the same day in good condition.  Her pulsatile tinnitus has completely resolved.  Her headaches are much improved since the procedure, rare headaches/no pressure.  She has not seen Dr. Freire, continues to have intermittent blurry vision, no TVOs since procedure.  Her puncture sites are well healed, not painful.  Denies dizziness, visual scintillations, episodes of visual loss, diplopia, hearing changes, tinnitus, speech problems, swallowing difficulties, numbness, tingling, weakness, tremors, twitches, seizures, imbalance or coordination difficulties\par \par She remains compliant with ASA 325mg and Plavix 75mg daily.

## 2021-08-20 NOTE — PHYSICAL EXAM
[General Appearance - Alert] : alert [General Appearance - In No Acute Distress] : in no acute distress [General Appearance - Well-Appearing] : healthy appearing [FreeTextEntry1] : Right Wrist and Right Groin Puncture Sites - Healed, No erythema or drainage [Oriented To Time, Place, And Person] : oriented to person, place, and time [Person] : oriented to person [Place] : oriented to place [Time] : oriented to time [Sclera] : the sclera and conjunctiva were normal [Outer Ear] : the ears and nose were normal in appearance [Neck Appearance] : the appearance of the neck was normal [] : no respiratory distress [Heart Rate And Rhythm] : heart rate was normal and rhythm regular [Abnormal Walk] : normal gait [Skin Color & Pigmentation] : normal skin color and pigmentation

## 2021-08-20 NOTE — ASSESSMENT
[FreeTextEntry1] : Impression:\par IIH with Failure/Intolerance of Medical Managment\par s/p Successful Venous Sinus Stenting on 7/23/21\par Pulsatile Tinnitus Completely Resolved\par No more TVOs, Intermittent Blurry Vision\par \par Plan:\par Continue ASA 325mg Daily\par Stop Plavix on 8/23/21\par Follow Up with Dr. Freire\par MRV Head w/wo in 10/2021\par Follow Up with Dr. Schulz after MRV\par

## 2021-10-21 ENCOUNTER — APPOINTMENT (OUTPATIENT)
Dept: NEUROSURGERY | Facility: CLINIC | Age: 31
End: 2021-10-21
Payer: MEDICAID

## 2021-10-21 VITALS
OXYGEN SATURATION: 99 % | HEART RATE: 121 BPM | HEIGHT: 63 IN | BODY MASS INDEX: 24.98 KG/M2 | DIASTOLIC BLOOD PRESSURE: 81 MMHG | WEIGHT: 141 LBS | SYSTOLIC BLOOD PRESSURE: 117 MMHG

## 2021-10-21 PROCEDURE — 99213 OFFICE O/P EST LOW 20 MIN: CPT

## 2021-10-21 RX ORDER — CLOPIDOGREL BISULFATE 75 MG/1
75 TABLET, FILM COATED ORAL DAILY
Qty: 40 | Refills: 0 | Status: DISCONTINUED | COMMUNITY
Start: 2021-06-22 | End: 2021-10-21

## 2021-10-21 NOTE — PHYSICAL EXAM
[General Appearance - Alert] : alert [General Appearance - Well-Appearing] : healthy appearing [General Appearance - In No Acute Distress] : in no acute distress [Oriented To Time, Place, And Person] : oriented to person, place, and time [Person] : oriented to person [Place] : oriented to place [Time] : oriented to time [Motor Tone] : muscle tone was normal in all four extremities [Sensation Tactile Decrease] : light touch was intact [Intact] : all motor groups within normal limits of strength and tone bilaterally [Sclera] : the sclera and conjunctiva were normal [Outer Ear] : the ears and nose were normal in appearance [Neck Appearance] : the appearance of the neck was normal [] : no respiratory distress [Heart Rate And Rhythm] : heart rate was normal and rhythm regular [Abnormal Walk] : normal gait [Skin Color & Pigmentation] : normal skin color and pigmentation

## 2021-10-28 NOTE — HISTORY OF PRESENT ILLNESS
[FreeTextEntry1] : Ms. BENSON is a pleasant 30yo female who presents today for follow up and MRV review.  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  Her pulsatile tinnitus has completely resolved. Her headaches are much improved since the procedure, occurring about 3x/month.  She also reports that her vision has improved over the last couple of months, no TVOs.  She still has not seen Dr. Freire since the procedure.  Denies dizziness, visual scintillations, episodes of visual loss, diplopia, hearing changes, tinnitus, speech problems, swallowing difficulties, numbness, tingling, weakness, tremors, twitches, seizures, imbalance or coordination difficulties\par \par She remains compliant with ASA 325mg.

## 2021-10-28 NOTE — ASSESSMENT
[FreeTextEntry1] : Impression:\par IIH with Failure/Intolerance of Medical Managment\par s/p Successful Venous Sinus Stenting on 7/23/21\par Pulsatile Tinnitus Completely Resolved\par No more TVOs, Intermittent Blurry Vision\par Has Not Seen Dr. Freire\par \par Plan:\par Continue ASA 325mg Daily\par Follow Up with Dr. Freire\par MRV Head w/wo in 7/2022\par Follow Up with Me after Imaging

## 2022-03-23 RX ORDER — ASPIRIN 325 MG/1
325 TABLET, FILM COATED ORAL DAILY
Qty: 30 | Refills: 6 | Status: ACTIVE | COMMUNITY
Start: 2021-06-22 | End: 1900-01-01

## 2022-10-11 ENCOUNTER — APPOINTMENT (OUTPATIENT)
Dept: NEUROSURGERY | Facility: CLINIC | Age: 32
End: 2022-10-11

## 2022-10-11 VITALS
TEMPERATURE: 97.9 F | HEIGHT: 63 IN | WEIGHT: 141 LBS | DIASTOLIC BLOOD PRESSURE: 72 MMHG | SYSTOLIC BLOOD PRESSURE: 110 MMHG | OXYGEN SATURATION: 100 % | BODY MASS INDEX: 24.98 KG/M2 | HEART RATE: 106 BPM

## 2022-10-11 DIAGNOSIS — G93.2 BENIGN INTRACRANIAL HYPERTENSION: ICD-10-CM

## 2022-10-11 PROCEDURE — 99213 OFFICE O/P EST LOW 20 MIN: CPT

## 2022-10-11 NOTE — PHYSICAL EXAM
[General Appearance - Alert] : alert [General Appearance - In No Acute Distress] : in no acute distress [General Appearance - Well-Appearing] : healthy appearing [Oriented To Time, Place, And Person] : oriented to person, place, and time [Person] : oriented to person [Place] : oriented to place [Time] : oriented to time [Motor Tone] : muscle tone was normal in all four extremities [Sensation Tactile Decrease] : light touch was intact [Intact] : all motor groups within normal limits of strength and tone bilaterally [Sclera] : the sclera and conjunctiva were normal [Outer Ear] : the ears and nose were normal in appearance [Neck Appearance] : the appearance of the neck was normal [] : no respiratory distress [Heart Rate And Rhythm] : heart rate was normal and rhythm regular [Abnormal Walk] : normal gait [Skin Color & Pigmentation] : normal skin color and pigmentation

## 2022-10-18 NOTE — ASSESSMENT
[FreeTextEntry1] : Impression:\par IIH with Failure/Intolerance of Medical Management\par s/p Successful Venous Sinus Stenting on 7/23/21\par Pulsatile Tinnitus Completely Resolved\par No more TVOs, Intermittent Blurry Vision, No Papilledema on last exam per patient.\par Headaches Improved\par \par MRV Head w/wo 8/2022 reveals patent stent, no new stenosis\par \par Will continue routine follow up with MRV in one year.\par \par Plan:\par Stop Aspirin\par MRV Head w/wo in 7/2023\par Follow Up with Me after Imaging

## 2022-10-18 NOTE — HISTORY OF PRESENT ILLNESS
[FreeTextEntry1] : Ms. BENSON is a pleasant 33yo female who presents today for follow up and MRV review.  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  Her pulsatile tinnitus has completely resolved. Her headaches are much improved since the procedure.  She also reports that her vision has improved, no TVOs.  \par \par She saw Dr. Freire in 9/2022 and did not have papilledema.  \par \par Denies dizziness, visual scintillations, episodes of visual loss, diplopia, hearing changes, tinnitus, speech problems, swallowing difficulties, numbness, tingling, weakness, tremors, twitches, seizures, imbalance or coordination difficulties\par \par She remains compliant with ASA 325mg.